# Patient Record
Sex: FEMALE | Race: WHITE | URBAN - METROPOLITAN AREA
[De-identification: names, ages, dates, MRNs, and addresses within clinical notes are randomized per-mention and may not be internally consistent; named-entity substitution may affect disease eponyms.]

---

## 2017-02-07 PROBLEM — R06.02 SHORTNESS OF BREATH: Status: ACTIVE | Noted: 2017-02-07

## 2017-02-07 PROBLEM — R09.81 NASAL CONGESTION: Status: ACTIVE | Noted: 2017-02-07

## 2017-02-07 PROBLEM — R12 HEARTBURN: Status: ACTIVE | Noted: 2017-02-07

## 2017-02-09 PROCEDURE — 86803 HEPATITIS C AB TEST: CPT | Performed by: FAMILY MEDICINE

## 2017-02-21 PROBLEM — N39.0 URINARY TRACT INFECTION WITHOUT HEMATURIA: Status: ACTIVE | Noted: 2017-02-21

## 2017-02-21 PROBLEM — Z87.898 HISTORY OF SHORTNESS OF BREATH: Status: ACTIVE | Noted: 2017-02-07

## 2017-02-21 PROBLEM — K76.0 FATTY LIVER: Status: ACTIVE | Noted: 2017-02-21

## 2017-02-21 PROBLEM — E78.1 PURE HYPERGLYCERIDEMIA: Status: ACTIVE | Noted: 2017-02-21

## 2017-02-21 PROBLEM — L03.011 PARONYCHIA OF RIGHT MIDDLE FINGER: Status: ACTIVE | Noted: 2017-02-21

## 2017-02-21 PROBLEM — Q89.09 ACCESSORY SPLEEN: Status: ACTIVE | Noted: 2017-02-21

## 2017-02-21 PROBLEM — B02.9 SHINGLES RASH: Status: ACTIVE | Noted: 2017-02-21

## 2017-05-26 ENCOUNTER — LAB ENCOUNTER (OUTPATIENT)
Dept: LAB | Facility: HOSPITAL | Age: 57
End: 2017-05-26
Payer: COMMERCIAL

## 2017-05-26 DIAGNOSIS — L71.9 ROSACEA: Primary | ICD-10-CM

## 2017-05-26 PROCEDURE — 86431 RHEUMATOID FACTOR QUANT: CPT

## 2017-05-26 PROCEDURE — 36415 COLL VENOUS BLD VENIPUNCTURE: CPT

## 2017-05-26 PROCEDURE — 85652 RBC SED RATE AUTOMATED: CPT

## 2017-05-26 PROCEDURE — 86618 LYME DISEASE ANTIBODY: CPT

## 2017-05-26 PROCEDURE — 86235 NUCLEAR ANTIGEN ANTIBODY: CPT

## 2017-05-26 PROCEDURE — 85025 COMPLETE CBC W/AUTO DIFF WBC: CPT

## 2017-05-26 PROCEDURE — 86140 C-REACTIVE PROTEIN: CPT

## 2017-05-26 PROCEDURE — 86038 ANTINUCLEAR ANTIBODIES: CPT

## 2017-05-26 PROCEDURE — 81001 URINALYSIS AUTO W/SCOPE: CPT

## 2017-05-26 PROCEDURE — 80053 COMPREHEN METABOLIC PANEL: CPT

## 2017-05-26 PROCEDURE — 84443 ASSAY THYROID STIM HORMONE: CPT

## 2017-05-26 PROCEDURE — 86225 DNA ANTIBODY NATIVE: CPT

## 2017-08-02 PROBLEM — L03.011 PARONYCHIA OF RIGHT MIDDLE FINGER: Status: RESOLVED | Noted: 2017-02-21 | Resolved: 2017-08-02

## 2017-08-02 PROBLEM — L30.9 ECZEMA OF BOTH HANDS: Status: ACTIVE | Noted: 2017-08-02

## 2017-08-02 PROBLEM — R12 HEARTBURN: Status: RESOLVED | Noted: 2017-02-07 | Resolved: 2017-08-02

## 2017-08-02 PROBLEM — N39.0 URINARY TRACT INFECTION WITHOUT HEMATURIA: Status: RESOLVED | Noted: 2017-02-21 | Resolved: 2017-08-02

## 2017-08-02 PROBLEM — R09.81 NASAL CONGESTION: Status: RESOLVED | Noted: 2017-02-07 | Resolved: 2017-08-02

## 2017-08-02 PROBLEM — B02.9 SHINGLES RASH: Status: RESOLVED | Noted: 2017-02-21 | Resolved: 2017-08-02

## 2017-08-02 PROBLEM — Z87.898 HISTORY OF SHORTNESS OF BREATH: Status: RESOLVED | Noted: 2017-02-07 | Resolved: 2017-08-02

## 2017-12-20 PROCEDURE — 88175 CYTOPATH C/V AUTO FLUID REDO: CPT | Performed by: OBSTETRICS & GYNECOLOGY

## 2018-02-01 PROBLEM — R06.00 DYSPNEA ON EXERTION: Status: ACTIVE | Noted: 2018-02-01

## 2018-02-01 PROBLEM — R06.09 DYSPNEA ON EXERTION: Status: ACTIVE | Noted: 2018-02-01

## 2018-04-23 PROBLEM — L20.89 OTHER ATOPIC DERMATITIS: Status: ACTIVE | Noted: 2018-04-23

## 2018-04-23 PROBLEM — L70.0 CYSTIC ACNE: Status: ACTIVE | Noted: 2018-04-23

## 2018-08-02 PROBLEM — R06.09 DYSPNEA ON EXERTION: Status: RESOLVED | Noted: 2018-02-01 | Resolved: 2018-08-02

## 2018-08-02 PROBLEM — R06.00 DYSPNEA ON EXERTION: Status: RESOLVED | Noted: 2018-02-01 | Resolved: 2018-08-02

## 2018-08-02 PROBLEM — Z87.19 HISTORY OF RECTAL BLEEDING: Status: ACTIVE | Noted: 2018-08-02

## 2018-08-02 PROBLEM — M25.562 ACUTE PAIN OF LEFT KNEE: Status: ACTIVE | Noted: 2018-08-02

## 2019-01-25 PROBLEM — L71.9 ROSACEA: Status: ACTIVE | Noted: 2019-01-25

## 2019-04-23 PROBLEM — M25.562 ACUTE PAIN OF LEFT KNEE: Status: RESOLVED | Noted: 2018-08-02 | Resolved: 2019-04-23

## 2019-04-23 PROBLEM — Z87.19 HISTORY OF RECTAL BLEEDING: Status: RESOLVED | Noted: 2018-08-02 | Resolved: 2019-04-23

## 2019-06-06 PROBLEM — N20.0 LEFT NEPHROLITHIASIS: Status: ACTIVE | Noted: 2019-06-06

## 2019-06-06 PROBLEM — K76.0 HEPATIC STEATOSIS: Status: ACTIVE | Noted: 2017-02-21

## 2019-07-01 PROCEDURE — 81015 MICROSCOPIC EXAM OF URINE: CPT | Performed by: PHYSICIAN ASSISTANT

## 2019-07-17 PROBLEM — Z12.11 ENCOUNTER FOR SCREENING COLONOSCOPY: Status: ACTIVE | Noted: 2019-07-17

## 2019-07-25 PROBLEM — R10.2 PELVIC PAIN: Status: ACTIVE | Noted: 2019-07-25

## 2019-07-25 PROBLEM — M62.89 PELVIC FLOOR DYSFUNCTION: Status: ACTIVE | Noted: 2019-07-25

## 2021-05-13 PROBLEM — T78.40XA ALLERGY, INITIAL ENCOUNTER: Status: ACTIVE | Noted: 2021-05-13

## 2021-05-13 PROBLEM — R09.89 CHEST CONGESTION: Status: ACTIVE | Noted: 2021-05-13

## 2021-05-13 PROBLEM — J45.50 SEVERE PERSISTENT ASTHMA, UNSPECIFIED WHETHER COMPLICATED: Status: ACTIVE | Noted: 2021-05-13

## 2021-11-01 PROBLEM — J38.6 SUBGLOTTIC STENOSIS: Status: ACTIVE | Noted: 2021-11-01

## 2021-11-01 RX ORDER — ACETAMINOPHEN 500 MG
1000 TABLET ORAL ONCE
Status: CANCELLED | OUTPATIENT
Start: 2021-11-01 | End: 2021-11-01

## 2021-11-01 RX ORDER — CEFAZOLIN SODIUM/WATER 2 G/20 ML
2 SYRINGE (ML) INTRAVENOUS ONCE
Status: CANCELLED | OUTPATIENT
Start: 2021-11-01 | End: 2021-11-01

## 2021-11-01 NOTE — H&P (VIEW-ONLY)
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     This consult was sent back the referring p

## 2021-11-04 ENCOUNTER — ANESTHESIA EVENT (OUTPATIENT)
Dept: SURGERY | Facility: HOSPITAL | Age: 61
End: 2021-11-04
Payer: COMMERCIAL

## 2021-11-05 ENCOUNTER — HOSPITAL ENCOUNTER (OUTPATIENT)
Facility: HOSPITAL | Age: 61
Setting detail: HOSPITAL OUTPATIENT SURGERY
Discharge: HOME | End: 2021-11-05
Attending: OTOLARYNGOLOGY | Admitting: OTOLARYNGOLOGY
Payer: COMMERCIAL

## 2021-11-05 ENCOUNTER — ANESTHESIA (OUTPATIENT)
Dept: SURGERY | Facility: HOSPITAL | Age: 61
End: 2021-11-05
Payer: COMMERCIAL

## 2021-11-05 VITALS
WEIGHT: 125 LBS | DIASTOLIC BLOOD PRESSURE: 67 MMHG | BODY MASS INDEX: 24.54 KG/M2 | HEART RATE: 78 BPM | TEMPERATURE: 97 F | OXYGEN SATURATION: 98 % | RESPIRATION RATE: 16 BRPM | SYSTOLIC BLOOD PRESSURE: 132 MMHG | HEIGHT: 60 IN

## 2021-11-05 DIAGNOSIS — J38.6 STENOSIS OF LARYNX: ICD-10-CM

## 2021-11-05 DIAGNOSIS — R06.02 SHORTNESS OF BREATH: ICD-10-CM

## 2021-11-05 DIAGNOSIS — Z20.822 ENCOUNTER FOR PREOPERATIVE SCREENING LABORATORY TESTING FOR COVID-19 VIRUS: Primary | ICD-10-CM

## 2021-11-05 DIAGNOSIS — Z01.812 ENCOUNTER FOR PREOPERATIVE SCREENING LABORATORY TESTING FOR COVID-19 VIRUS: Primary | ICD-10-CM

## 2021-11-05 PROCEDURE — 0CBS8ZX EXCISION OF LARYNX, VIA NATURAL OR ARTIFICIAL OPENING ENDOSCOPIC, DIAGNOSTIC: ICD-10-PCS | Performed by: OTOLARYNGOLOGY

## 2021-11-05 PROCEDURE — 0C7S8ZZ DILATION OF LARYNX, VIA NATURAL OR ARTIFICIAL OPENING ENDOSCOPIC: ICD-10-PCS | Performed by: OTOLARYNGOLOGY

## 2021-11-05 PROCEDURE — 88305 TISSUE EXAM BY PATHOLOGIST: CPT | Performed by: OTOLARYNGOLOGY

## 2021-11-05 RX ORDER — HYDROCODONE BITARTRATE AND ACETAMINOPHEN 5; 325 MG/1; MG/1
1 TABLET ORAL AS NEEDED
Status: DISCONTINUED | OUTPATIENT
Start: 2021-11-05 | End: 2021-11-05

## 2021-11-05 RX ORDER — DEXAMETHASONE SODIUM PHOSPHATE 10 MG/ML
INJECTION, SOLUTION INTRAMUSCULAR; INTRAVENOUS AS NEEDED
Status: DISCONTINUED | OUTPATIENT
Start: 2021-11-05 | End: 2021-11-05 | Stop reason: SURG

## 2021-11-05 RX ORDER — NALOXONE HYDROCHLORIDE 0.4 MG/ML
80 INJECTION, SOLUTION INTRAMUSCULAR; INTRAVENOUS; SUBCUTANEOUS AS NEEDED
Status: DISCONTINUED | OUTPATIENT
Start: 2021-11-05 | End: 2021-11-05

## 2021-11-05 RX ORDER — MIDAZOLAM HYDROCHLORIDE 1 MG/ML
1 INJECTION INTRAMUSCULAR; INTRAVENOUS EVERY 5 MIN PRN
Status: DISCONTINUED | OUTPATIENT
Start: 2021-11-05 | End: 2021-11-05

## 2021-11-05 RX ORDER — LABETALOL HYDROCHLORIDE 5 MG/ML
5 INJECTION, SOLUTION INTRAVENOUS EVERY 5 MIN PRN
Status: DISCONTINUED | OUTPATIENT
Start: 2021-11-05 | End: 2021-11-05

## 2021-11-05 RX ORDER — METOCLOPRAMIDE HYDROCHLORIDE 5 MG/ML
10 INJECTION INTRAMUSCULAR; INTRAVENOUS AS NEEDED
Status: DISCONTINUED | OUTPATIENT
Start: 2021-11-05 | End: 2021-11-05

## 2021-11-05 RX ORDER — SODIUM CHLORIDE, SODIUM LACTATE, POTASSIUM CHLORIDE, CALCIUM CHLORIDE 600; 310; 30; 20 MG/100ML; MG/100ML; MG/100ML; MG/100ML
INJECTION, SOLUTION INTRAVENOUS CONTINUOUS
Status: DISCONTINUED | OUTPATIENT
Start: 2021-11-05 | End: 2021-11-05

## 2021-11-05 RX ORDER — METHYLPREDNISOLONE 4 MG/1
TABLET ORAL
Qty: 1 EACH | Refills: 0 | Status: SHIPPED | OUTPATIENT
Start: 2021-11-05 | End: 2021-11-29

## 2021-11-05 RX ORDER — ONDANSETRON 2 MG/ML
4 INJECTION INTRAMUSCULAR; INTRAVENOUS AS NEEDED
Status: DISCONTINUED | OUTPATIENT
Start: 2021-11-05 | End: 2021-11-05

## 2021-11-05 RX ORDER — HYDROCODONE BITARTRATE AND ACETAMINOPHEN 7.5; 325 MG/1; MG/1
1 TABLET ORAL EVERY 6 HOURS PRN
Qty: 10 TABLET | Refills: 0 | Status: SHIPPED | OUTPATIENT
Start: 2021-11-05 | End: 2021-11-29

## 2021-11-05 RX ORDER — ACETAMINOPHEN 500 MG
1000 TABLET ORAL ONCE
COMMUNITY

## 2021-11-05 RX ORDER — MIDAZOLAM HYDROCHLORIDE 1 MG/ML
INJECTION INTRAMUSCULAR; INTRAVENOUS AS NEEDED
Status: DISCONTINUED | OUTPATIENT
Start: 2021-11-05 | End: 2021-11-05 | Stop reason: SURG

## 2021-11-05 RX ORDER — ACETAMINOPHEN 500 MG
1000 TABLET ORAL ONCE AS NEEDED
Status: DISCONTINUED | OUTPATIENT
Start: 2021-11-05 | End: 2021-11-05

## 2021-11-05 RX ORDER — MEPERIDINE HYDROCHLORIDE 25 MG/ML
12.5 INJECTION INTRAMUSCULAR; INTRAVENOUS; SUBCUTANEOUS AS NEEDED
Status: DISCONTINUED | OUTPATIENT
Start: 2021-11-05 | End: 2021-11-05

## 2021-11-05 RX ORDER — HYDROCODONE BITARTRATE AND ACETAMINOPHEN 5; 325 MG/1; MG/1
2 TABLET ORAL AS NEEDED
Status: DISCONTINUED | OUTPATIENT
Start: 2021-11-05 | End: 2021-11-05

## 2021-11-05 RX ORDER — HYDROMORPHONE HYDROCHLORIDE 1 MG/ML
0.4 INJECTION, SOLUTION INTRAMUSCULAR; INTRAVENOUS; SUBCUTANEOUS EVERY 5 MIN PRN
Status: DISCONTINUED | OUTPATIENT
Start: 2021-11-05 | End: 2021-11-05

## 2021-11-05 RX ADMIN — MIDAZOLAM HYDROCHLORIDE 2 MG: 1 INJECTION INTRAMUSCULAR; INTRAVENOUS at 13:34:00

## 2021-11-05 RX ADMIN — DEXAMETHASONE SODIUM PHOSPHATE 10 MG: 10 INJECTION, SOLUTION INTRAMUSCULAR; INTRAVENOUS at 13:56:00

## 2021-11-05 NOTE — INTERVAL H&P NOTE
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

## 2021-11-05 NOTE — ANESTHESIA PREPROCEDURE EVALUATION
PRE-OP EVALUATION    Patient Name: mEmanuel Advent    Admit Diagnosis: Stenosis of larynx [J38.6]  Shortness of breath [R06.02]    Pre-op Diagnosis: Stenosis of larynx [J38.6]  Shortness of breath [R06.02]    Microsuspension Direct Laryngoscopy with Balloo tablet, Rfl: 0, More than a month at Unknown time  Ipratropium Bromide 0.06 % Nasal Solution, 1 spray by Nasal route 3 (three) times daily.  (Patient not taking: Reported on 11/1/2021), Disp: 1 each, Rfl: 3, More than a month at Unknown time  Meloxicam 7.5

## 2021-11-05 NOTE — ANESTHESIA POSTPROCEDURE EVALUATION
[facility] [facility] Patient Status:  Hospital Outpatient Surgery   Age/Gender 63year old female MRN [id number]   Location [address] [facility] Attending Eleanor Barth MD   Hosp Day # 0 PCP Adrienne Dean MD       A

## 2021-11-05 NOTE — BRIEF OP NOTE
Pre-Operative Diagnosis: Stenosis of larynx [J38.6]  Shortness of breath [R06.02]     Post-Operative Diagnosis: Stenosis of larynx [J38.6]  Shortness of breath [R06.02]     Procedure Performed:   Microsuspension Direct Laryngoscopy with Balloon dilation of

## 2021-11-06 NOTE — OPERATIVE REPORT
[facility]    PATIENT'S NAME: James Naylor   ATTENDING PHYSICIAN: Adrianna Coyle M.D. OPERATING PHYSICIAN: Adrianna Coyle M.D.    PATIENT ACCOUNT#:   [de-identified]    LOCATION:  PREOPASCC  PRE [facility] 1 EDWP 10  MEDICAL RECORD #:   [id number]       JOAQUIN 3 mm below the vocal cords and 1 cm in length. It was circumferential in nature both anteriorly and posteriorly. Then, 0-degree endoscopy was then performed of the subglottis, the trachea, and the charli.   The trachea and the charli were normal.  Microsc

## 2023-01-12 ENCOUNTER — ANESTHESIA EVENT (OUTPATIENT)
Dept: SURGERY | Facility: HOSPITAL | Age: 63
End: 2023-01-12
Payer: COMMERCIAL

## 2023-01-13 ENCOUNTER — ANESTHESIA (OUTPATIENT)
Dept: SURGERY | Facility: HOSPITAL | Age: 63
End: 2023-01-13
Payer: COMMERCIAL

## 2023-01-13 ENCOUNTER — HOSPITAL ENCOUNTER (OUTPATIENT)
Facility: HOSPITAL | Age: 63
Setting detail: HOSPITAL OUTPATIENT SURGERY
Discharge: HOME | End: 2023-01-13
Attending: OTOLARYNGOLOGY | Admitting: OTOLARYNGOLOGY
Payer: COMMERCIAL

## 2023-01-13 VITALS
BODY MASS INDEX: 26.7 KG/M2 | DIASTOLIC BLOOD PRESSURE: 64 MMHG | HEIGHT: 60 IN | OXYGEN SATURATION: 95 % | RESPIRATION RATE: 16 BRPM | HEART RATE: 72 BPM | WEIGHT: 136 LBS | SYSTOLIC BLOOD PRESSURE: 131 MMHG | TEMPERATURE: 97 F

## 2023-01-13 DIAGNOSIS — Z20.822 ENCOUNTER FOR PREOPERATIVE SCREENING LABORATORY TESTING FOR COVID-19 VIRUS: Primary | ICD-10-CM

## 2023-01-13 DIAGNOSIS — Z01.812 ENCOUNTER FOR PREOPERATIVE SCREENING LABORATORY TESTING FOR COVID-19 VIRUS: Primary | ICD-10-CM

## 2023-01-13 LAB — SARS-COV-2 RNA RESP QL NAA+PROBE: NOT DETECTED

## 2023-01-13 PROCEDURE — 0C7S8ZZ DILATION OF LARYNX, VIA NATURAL OR ARTIFICIAL OPENING ENDOSCOPIC: ICD-10-PCS | Performed by: OTOLARYNGOLOGY

## 2023-01-13 PROCEDURE — 0B718ZZ DILATION OF TRACHEA, VIA NATURAL OR ARTIFICIAL OPENING ENDOSCOPIC: ICD-10-PCS | Performed by: OTOLARYNGOLOGY

## 2023-01-13 RX ORDER — HYDROCODONE BITARTRATE AND ACETAMINOPHEN 5; 325 MG/1; MG/1
1 TABLET ORAL ONCE AS NEEDED
Status: COMPLETED | OUTPATIENT
Start: 2023-01-13 | End: 2023-01-13

## 2023-01-13 RX ORDER — SODIUM CHLORIDE 9 MG/ML
INJECTION INTRAVENOUS AS NEEDED
Status: DISCONTINUED | OUTPATIENT
Start: 2023-01-13 | End: 2023-01-13 | Stop reason: HOSPADM

## 2023-01-13 RX ORDER — MIDAZOLAM HYDROCHLORIDE 1 MG/ML
INJECTION INTRAMUSCULAR; INTRAVENOUS AS NEEDED
Status: DISCONTINUED | OUTPATIENT
Start: 2023-01-13 | End: 2023-01-13 | Stop reason: SURG

## 2023-01-13 RX ORDER — SODIUM CHLORIDE, SODIUM LACTATE, POTASSIUM CHLORIDE, CALCIUM CHLORIDE 600; 310; 30; 20 MG/100ML; MG/100ML; MG/100ML; MG/100ML
INJECTION, SOLUTION INTRAVENOUS CONTINUOUS
Status: DISCONTINUED | OUTPATIENT
Start: 2023-01-13 | End: 2023-01-13

## 2023-01-13 RX ORDER — SCOLOPAMINE TRANSDERMAL SYSTEM 1 MG/1
1 PATCH, EXTENDED RELEASE TRANSDERMAL ONCE
Status: DISCONTINUED | OUTPATIENT
Start: 2023-01-13 | End: 2023-01-13 | Stop reason: HOSPADM

## 2023-01-13 RX ORDER — HYDROCODONE BITARTRATE AND ACETAMINOPHEN 5; 325 MG/1; MG/1
2 TABLET ORAL ONCE AS NEEDED
Status: COMPLETED | OUTPATIENT
Start: 2023-01-13 | End: 2023-01-13

## 2023-01-13 RX ORDER — ONDANSETRON 2 MG/ML
INJECTION INTRAMUSCULAR; INTRAVENOUS AS NEEDED
Status: DISCONTINUED | OUTPATIENT
Start: 2023-01-13 | End: 2023-01-13 | Stop reason: SURG

## 2023-01-13 RX ORDER — SODIUM CHLORIDE, SODIUM LACTATE, POTASSIUM CHLORIDE, CALCIUM CHLORIDE 600; 310; 30; 20 MG/100ML; MG/100ML; MG/100ML; MG/100ML
INJECTION, SOLUTION INTRAVENOUS CONTINUOUS PRN
Status: DISCONTINUED | OUTPATIENT
Start: 2023-01-13 | End: 2023-01-13 | Stop reason: SURG

## 2023-01-13 RX ORDER — ACETAMINOPHEN 500 MG
1000 TABLET ORAL ONCE AS NEEDED
Status: COMPLETED | OUTPATIENT
Start: 2023-01-13 | End: 2023-01-13

## 2023-01-13 RX ORDER — DEXAMETHASONE SODIUM PHOSPHATE 4 MG/ML
VIAL (ML) INJECTION AS NEEDED
Status: DISCONTINUED | OUTPATIENT
Start: 2023-01-13 | End: 2023-01-13 | Stop reason: SURG

## 2023-01-13 RX ORDER — METHYLPREDNISOLONE 4 MG/1
TABLET ORAL
Qty: 1 EACH | Refills: 0 | Status: SHIPPED | OUTPATIENT
Start: 2023-01-13

## 2023-01-13 RX ORDER — HYDROMORPHONE HYDROCHLORIDE 1 MG/ML
0.2 INJECTION, SOLUTION INTRAMUSCULAR; INTRAVENOUS; SUBCUTANEOUS EVERY 5 MIN PRN
Status: DISCONTINUED | OUTPATIENT
Start: 2023-01-13 | End: 2023-01-13

## 2023-01-13 RX ORDER — HYDROMORPHONE HYDROCHLORIDE 1 MG/ML
0.4 INJECTION, SOLUTION INTRAMUSCULAR; INTRAVENOUS; SUBCUTANEOUS EVERY 5 MIN PRN
Status: DISCONTINUED | OUTPATIENT
Start: 2023-01-13 | End: 2023-01-13

## 2023-01-13 RX ORDER — LIDOCAINE HYDROCHLORIDE 10 MG/ML
INJECTION, SOLUTION EPIDURAL; INFILTRATION; INTRACAUDAL; PERINEURAL AS NEEDED
Status: DISCONTINUED | OUTPATIENT
Start: 2023-01-13 | End: 2023-01-13 | Stop reason: SURG

## 2023-01-13 RX ORDER — NALOXONE HYDROCHLORIDE 0.4 MG/ML
80 INJECTION, SOLUTION INTRAMUSCULAR; INTRAVENOUS; SUBCUTANEOUS AS NEEDED
Status: DISCONTINUED | OUTPATIENT
Start: 2023-01-13 | End: 2023-01-13

## 2023-01-13 RX ORDER — ONDANSETRON 2 MG/ML
4 INJECTION INTRAMUSCULAR; INTRAVENOUS EVERY 6 HOURS PRN
Status: DISCONTINUED | OUTPATIENT
Start: 2023-01-13 | End: 2023-01-13

## 2023-01-13 RX ORDER — ACETAMINOPHEN 500 MG
1000 TABLET ORAL ONCE
Status: DISCONTINUED | OUTPATIENT
Start: 2023-01-13 | End: 2023-01-13 | Stop reason: HOSPADM

## 2023-01-13 RX ORDER — HYDROCODONE BITARTRATE AND ACETAMINOPHEN 7.5; 325 MG/1; MG/1
1 TABLET ORAL EVERY 6 HOURS PRN
Qty: 10 TABLET | Refills: 0 | Status: SHIPPED | OUTPATIENT
Start: 2023-01-13

## 2023-01-13 RX ORDER — HYDROMORPHONE HYDROCHLORIDE 1 MG/ML
0.6 INJECTION, SOLUTION INTRAMUSCULAR; INTRAVENOUS; SUBCUTANEOUS EVERY 5 MIN PRN
Status: DISCONTINUED | OUTPATIENT
Start: 2023-01-13 | End: 2023-01-13

## 2023-01-13 RX ADMIN — LIDOCAINE HYDROCHLORIDE 50 MG: 10 INJECTION, SOLUTION EPIDURAL; INFILTRATION; INTRACAUDAL; PERINEURAL at 13:51:00

## 2023-01-13 RX ADMIN — SODIUM CHLORIDE, SODIUM LACTATE, POTASSIUM CHLORIDE, CALCIUM CHLORIDE: 600; 310; 30; 20 INJECTION, SOLUTION INTRAVENOUS at 14:09:00

## 2023-01-13 RX ADMIN — SODIUM CHLORIDE, SODIUM LACTATE, POTASSIUM CHLORIDE, CALCIUM CHLORIDE: 600; 310; 30; 20 INJECTION, SOLUTION INTRAVENOUS at 13:55:00

## 2023-01-13 RX ADMIN — ONDANSETRON 4 MG: 2 INJECTION INTRAMUSCULAR; INTRAVENOUS at 13:55:00

## 2023-01-13 RX ADMIN — SODIUM CHLORIDE, SODIUM LACTATE, POTASSIUM CHLORIDE, CALCIUM CHLORIDE: 600; 310; 30; 20 INJECTION, SOLUTION INTRAVENOUS at 13:47:00

## 2023-01-13 RX ADMIN — DEXAMETHASONE SODIUM PHOSPHATE 8 MG: 4 MG/ML VIAL (ML) INJECTION at 13:54:00

## 2023-01-13 RX ADMIN — MIDAZOLAM HYDROCHLORIDE 2 MG: 1 INJECTION INTRAMUSCULAR; INTRAVENOUS at 13:48:00

## 2023-01-13 NOTE — BRIEF OP NOTE
Pre-Operative Diagnosis: SUBGLOTTIC STENOSIS, CHRONIC LARYNGITIS     Post-Operative Diagnosis: * No post-op diagnosis entered *      Procedure Performed:   MICROSUSPENSION DIRECT LARYNGOSCOPY WITH DILATION OF SUBGLOTTIC STENOSIS, TRACHEOBRONCHOSCOPY C02 LASER, JET VENTILATION    Surgeon(s) and Role:     * Moe Lim MD - Primary    Assistant(s):        Surgical Findings: Subglottic stenosis     Specimen: None     Estimated Blood Loss: No data recorded    Dictation Number:      Janene Wong MD  1/13/2023  1:31 PM

## 2023-01-13 NOTE — DISCHARGE INSTRUCTIONS
NO VOICE REST    REGULAR  DIET    Call  [facility] at [phone] or if it is after hours ask to have the doctor on call paged with:    * any fresh bleeding from the nose or mouth  * A temperature greater than 102F  * Vomiting that lasts more than 24 hours  * Severe pain that gets worse and is not helped by medicine  * Coughing that will not go away  * Problems drinking fluids for more than 24 hours or in not able to urinate  * Neck pain, stiffness or has a hard time turning their head    Call with any other questions or concerns    Appointments you need to make: You should make a follow up appointment for 3 weeks after surgery. What to expect:  * You will have throat, ear and jaw pain  * Bad breath  * increased nasal drainage  * mild fever for a few days after surgery    Pain:  * You may have acetaminophen (Tylenol) every 4 to 6 hours or Ibuprofen every 6-8 hours as needed  * If have a known bleeding problem then no Ibuprofen can be given  * If you need additional pain medication, please call the nursing line at [phone] x [phone]    Diet:  * Offer plenty of fluids  * Start with clear liquids (flat white soda, water, broth, apple juice, and popsicles)  * If you  Do not have an upset stomach when fully awake from surgery, a soft diet can be started. Avoid spicy, acidic or rough foods (includes toast, crackers, and potato chips)  * If you are constipated, please use over the counter Miralax    Activity:  * Recovery takes 1-2 days.       With any concerns or questions, call and ask for the ENT on call physician

## 2023-01-13 NOTE — ANESTHESIA POSTPROCEDURE EVALUATION
[facility] [facility] Patient Status:  Hospital Outpatient Surgery   Age/Gender 63year old female MRN [id number]   Location [facility] Attending Fela Orozco MD   Hosp Day # 0 PCP Kahlil Lyon MD       Anesthesia Post-op Note    MICROSUSPENSION DIRECT LARYNGOSCOPY WITH DILATION OF SUBGLOTTIC STENOSIS, TRACHEOBRONCHOSCOPY, JET VENTILATION     Procedure Summary     Date: 01/13/23 Room / Location: 1404 [facility] OR 02 / 1404 [facility] OR    Anesthesia Start: 1347 Anesthesia Stop: 9639    Procedure: MICROSUSPENSION DIRECT LARYNGOSCOPY WITH DILATION OF SUBGLOTTIC STENOSIS, TRACHEOBRONCHOSCOPY, JET VENTILATION Diagnosis: (SUBGLOTTIC STENOSIS, CHRONIC LARYNGITIS)    Surgeons: Fela Orozco MD Anesthesiologist: Padmini Bolton MD    Anesthesia Type: general ASA Status: 2          Anesthesia Type: general    Vitals Value Taken Time   /74 01/13/23 1420   Temp 97.2 01/13/23 1421   Pulse 87 01/13/23 1420   Resp 18 01/13/23 1420   SpO2 99 % (RA) 01/13/23 1420   Vitals shown include unvalidated device data. Patient Location: PACU    Anesthesia Type: general    Airway Patency: patent    Postop Pain Control: adequate    Mental Status: preanesthetic baseline    Nausea/Vomiting: none    Cardiopulmonary/Hydration status: stable euvolemic    Complications: no apparent anesthesia related complications    Postop vital signs: stable    Dental Exam: Unchanged from Preop    Patient to be discharged from PACU when criteria met.

## 2023-01-14 NOTE — OPERATIVE REPORT
[facility]    PATIENT'S NAME: Godfrey Scott   ATTENDING PHYSICIAN: Michelle Venegas M.D. OPERATING PHYSICIAN: Michelle Venegas M.D. PATIENT ACCOUNT#:   [de-identified]    LOCATION:  [facility] 2 EDW 10  MEDICAL RECORD #:   [id number]       YOB: 1960  ADMISSION DATE:       01/13/2023      OPERATION DATE:  01/13/2023    OPERATIVE REPORT      PREOPERATIVE DIAGNOSIS:    1.   Subglottic stenosis. 2.   Shortness of breath. POSTOPERATIVE DIAGNOSIS:    1.   Subglottic stenosis. 2.   Shortness of breath. PROCEDURE:    1.   Microsuspension direct laryngoscopy with dilation of subglottic stenosis. 2.   Tracheal bronchoscopy. ANESTHESIA:  General jet ventilation. DRAINS:  None. URINE OUTPUT:  None     COMPLICATIONS:  None. SPECIMEN:  None. INTRAVENOUS FLUIDS:  500 mL LR.      ESTIMATED BLOOD LOSS:  1 mL. INDICATIONS:  The patient is a very pleasant 63-year-old female with a history of subglottic stenosis. She is offered above-named procedures for possible definitive treatment. The risks and benefits of procedure were discussed, and she agreed to proceed forward. Informed consent was obtained. OPERATIVE TECHNIQUE:   The patient was taken to the operating room, laid supine on the operating table. After adequate IV anesthesia, we were able to easily bag mask and ventilate the patient. The table was turned right laterally 90 degrees. A tooth guard was placed on the upper teeth and a large Ossoff-Pilling laryngoscope was placed in the oral cavity after lubrication was placed on the lips. Examination of the base of tongue, tonsillar fossa, piriform, sinuses, posterior cricoid area were all normal.  The laryngeal and lingual surfaces of the epiglottis were normal.  I suspended the patient in standard fashion with the Lewy arm.   There was found to be a recurrence of her immediate subglottic stenosis which was approximately 3 mm below the vocal cord and 1 cm in length. It was circumferential in nature both anteriorly and posteriorly. A 0-degree endoscopy was then performed of the subglottis, the trachea, and the charli. The trachea and the charli were normal.  The microscope was brought into view. A keyhole incision was made at the 6 o'clock and the 2 o'clock positions with an upbiting scissors. The airway balloon was then placed across the stenosis and dilated to a pressure of 6. There was found to be good relief of the stenosis. The balloon was removed. A 0-degree photo endoscopy was then taken postoperatively. All instruments were removed from the oral cavity and nose. The patient was given back to Anesthesia where she was reversed without complications. The sponge, needle, and instrument counts were correct at the end of the case. There were no complications. I performed all parts of this procedure.     Dictated By Kaleb Slater M.D.  d: 01/13/2023 14:35:25  t: 01/13/2023 23:39:31  Job [id number]  [place]/

## (undated) DEVICE — USE ITEM #176901

## (undated) DEVICE — CATH BALLOON SINUS 16MM

## (undated) DEVICE — SLEEVE KENDALL SCD EXPRESS MED

## (undated) DEVICE — LARYNGOSCOPY: Brand: MEDLINE INDUSTRIES, INC.

## (undated) DEVICE — DEVICE BALLOON INFLTN SINUS

## (undated) DEVICE — EYE PADSSTERILENOT MADE WITH NATURAL RUBBER LATEXSINGLE USE ONLYDO NOT USE IF PACKAGE OPENED OR DAMAGED: Brand: CARDINAL HEALTH

## (undated) DEVICE — BASIC DOUBLE BASIN 1-LF: Brand: MEDLINE INDUSTRIES, INC.

## (undated) DEVICE — CATH URTH BARD 10FR 16IN 2

## (undated) DEVICE — SOL H2O 1000ML BTL

## (undated) DEVICE — Device

## (undated) DEVICE — SOL NACL IRRIG 0.9% 1000ML BTL

## (undated) DEVICE — CODMAN® SURGICAL PATTIES 1/2" X 3" (1.27CM X 7.62CM): Brand: CODMAN®

## (undated) DEVICE — STERILE POLYISOPRENE POWDER-FREE SURGICAL GLOVES: Brand: PROTEXIS

## (undated) DEVICE — STERILE WATER 1000ML BTL

## (undated) DEVICE — SCD SLEEVE KNEE HI BLEND